# Patient Record
Sex: MALE | Race: ASIAN | NOT HISPANIC OR LATINO | URBAN - METROPOLITAN AREA
[De-identification: names, ages, dates, MRNs, and addresses within clinical notes are randomized per-mention and may not be internally consistent; named-entity substitution may affect disease eponyms.]

---

## 2019-03-04 ENCOUNTER — EMERGENCY (EMERGENCY)
Facility: HOSPITAL | Age: 34
LOS: 1 days | Discharge: HOME | End: 2019-03-04
Attending: EMERGENCY MEDICINE | Admitting: EMERGENCY MEDICINE
Payer: MEDICAID

## 2019-03-04 VITALS
SYSTOLIC BLOOD PRESSURE: 123 MMHG | TEMPERATURE: 98 F | HEART RATE: 100 BPM | RESPIRATION RATE: 18 BRPM | DIASTOLIC BLOOD PRESSURE: 88 MMHG | OXYGEN SATURATION: 100 %

## 2019-03-04 PROCEDURE — 99284 EMERGENCY DEPT VISIT MOD MDM: CPT

## 2019-03-04 RX ORDER — DIAZEPAM 5 MG
1 TABLET ORAL
Qty: 6 | Refills: 0 | OUTPATIENT
Start: 2019-03-04 | End: 2019-03-05

## 2019-03-04 RX ORDER — KETOROLAC TROMETHAMINE 30 MG/ML
1 SYRINGE (ML) INJECTION
Qty: 20 | Refills: 0 | OUTPATIENT
Start: 2019-03-04 | End: 2019-03-08

## 2019-03-04 RX ORDER — KETOROLAC TROMETHAMINE 30 MG/ML
30 SYRINGE (ML) INJECTION ONCE
Qty: 0 | Refills: 0 | Status: DISCONTINUED | OUTPATIENT
Start: 2019-03-04 | End: 2019-03-04

## 2019-03-04 RX ORDER — DIAZEPAM 5 MG
10 TABLET ORAL ONCE
Qty: 0 | Refills: 0 | Status: DISCONTINUED | OUTPATIENT
Start: 2019-03-04 | End: 2019-03-04

## 2019-03-04 RX ORDER — SODIUM CHLORIDE 9 MG/ML
1000 INJECTION INTRAMUSCULAR; INTRAVENOUS; SUBCUTANEOUS ONCE
Qty: 0 | Refills: 0 | Status: COMPLETED | OUTPATIENT
Start: 2019-03-04 | End: 2019-03-04

## 2019-03-04 RX ADMIN — Medication 10 MILLIGRAM(S): at 12:56

## 2019-03-04 RX ADMIN — SODIUM CHLORIDE 1000 MILLILITER(S): 9 INJECTION INTRAMUSCULAR; INTRAVENOUS; SUBCUTANEOUS at 12:56

## 2019-03-04 RX ADMIN — Medication 30 MILLIGRAM(S): at 12:56

## 2019-03-04 NOTE — ED PROVIDER NOTE - OBJECTIVE STATEMENT
34 y/o M with no significant PMHx presents to ED with R upper back pain since 2 weeks. Pt states that the pain suddenly began when he woke up 2 weeks ago. Pt notes that the pain was originally on the L and R side of the back, but it now is only on the R side. Pt reports using heating pads and taking muscle relaxer without improvement of symptoms. Associated with these symptoms pt has stiffness and diarrhea. Pt reports that the pain worsens at night. 34 y/o M with no significant PMHx presents to ED with R upper back pain since 2 weeks. Pt states that the pain suddenly began when he woke up 2 weeks ago. Pt notes that the pain was originally on the L and R side of the back, but it now is only on the R side. Pt reports using heating pads and taking muscle relaxer without improvement of symptoms. Associated with these symptoms pt has stiffness and diarrhea. Pt reports that the pain worsens at night and with movement.

## 2019-03-04 NOTE — ED ADULT TRIAGE NOTE - CHIEF COMPLAINT QUOTE
Pt states woke up with right upper back pain radiates to shoulder and neck x 2 weeks pain does not radiate into chest. Pt states took medication for pain no relief.

## 2019-03-04 NOTE — ED PROVIDER NOTE - NSFOLLOWUPINSTRUCTIONS_ED_ALL_ED_FT
-- Continue all previously prescribed medications as directed unless otherwise instructed.    -- Follow up with your primary care physician in 48-72 hours- bring copies of your results.    -- Return to the ER for worsening or persistent symptoms, and/or ANY NEW OR CONCERNING SYMPTOMS.    -- If you have issues obtaining follow up, please call: [phone] to obtain a [facility] doctor or specialist who takes your insurance in your area.

## 2019-03-04 NOTE — ED PROVIDER NOTE - MUSCULOSKELETAL, MLM
Spine appears normal, range of motion is not limited, muscle ttp along the R paraspinal area into the upper trapezius muscle, no joint tenderness

## 2019-08-08 ENCOUNTER — EMERGENCY (EMERGENCY)
Facility: HOSPITAL | Age: 34
LOS: 1 days | Discharge: HOME | End: 2019-08-08
Attending: EMERGENCY MEDICINE | Admitting: EMERGENCY MEDICINE
Payer: MEDICAID

## 2019-08-08 VITALS
OXYGEN SATURATION: 100 % | HEART RATE: 82 BPM | WEIGHT: 156.97 LBS | SYSTOLIC BLOOD PRESSURE: 127 MMHG | TEMPERATURE: 98 F | HEIGHT: 68 IN | RESPIRATION RATE: 16 BRPM | DIASTOLIC BLOOD PRESSURE: 78 MMHG

## 2019-08-08 VITALS
SYSTOLIC BLOOD PRESSURE: 127 MMHG | HEART RATE: 70 BPM | TEMPERATURE: 98 F | OXYGEN SATURATION: 100 % | RESPIRATION RATE: 16 BRPM | DIASTOLIC BLOOD PRESSURE: 86 MMHG

## 2019-08-08 DIAGNOSIS — F12.10 CANNABIS ABUSE, UNCOMPLICATED: ICD-10-CM

## 2019-08-08 DIAGNOSIS — F41.9 ANXIETY DISORDER, UNSPECIFIED: ICD-10-CM

## 2019-08-08 LAB
ALBUMIN SERPL ELPH-MCNC: 4.3 G/DL — SIGNIFICANT CHANGE UP (ref 3.3–5)
ALP SERPL-CCNC: 103 U/L — SIGNIFICANT CHANGE UP (ref 40–120)
ALT FLD-CCNC: 26 U/L — SIGNIFICANT CHANGE UP (ref 4–41)
AMPHET UR-MCNC: NEGATIVE — SIGNIFICANT CHANGE UP
ANION GAP SERPL CALC-SCNC: 14 MMO/L — SIGNIFICANT CHANGE UP (ref 7–14)
APAP SERPL-MCNC: < 15 UG/ML — LOW (ref 15–25)
AST SERPL-CCNC: 15 U/L — SIGNIFICANT CHANGE UP (ref 4–40)
BARBITURATES UR SCN-MCNC: NEGATIVE — SIGNIFICANT CHANGE UP
BASOPHILS # BLD AUTO: 0.07 K/UL — SIGNIFICANT CHANGE UP (ref 0–0.2)
BASOPHILS NFR BLD AUTO: 0.7 % — SIGNIFICANT CHANGE UP (ref 0–2)
BENZODIAZ UR-MCNC: NEGATIVE — SIGNIFICANT CHANGE UP
BILIRUB SERPL-MCNC: < 0.2 MG/DL — LOW (ref 0.2–1.2)
BUN SERPL-MCNC: 11 MG/DL — SIGNIFICANT CHANGE UP (ref 7–23)
CALCIUM SERPL-MCNC: 9.7 MG/DL — SIGNIFICANT CHANGE UP (ref 8.4–10.5)
CANNABINOIDS UR-MCNC: POSITIVE — SIGNIFICANT CHANGE UP
CHLORIDE SERPL-SCNC: 104 MMOL/L — SIGNIFICANT CHANGE UP (ref 98–107)
CO2 SERPL-SCNC: 23 MMOL/L — SIGNIFICANT CHANGE UP (ref 22–31)
COCAINE METAB.OTHER UR-MCNC: NEGATIVE — SIGNIFICANT CHANGE UP
CREAT SERPL-MCNC: 0.77 MG/DL — SIGNIFICANT CHANGE UP (ref 0.5–1.3)
EOSINOPHIL # BLD AUTO: 0.18 K/UL — SIGNIFICANT CHANGE UP (ref 0–0.5)
EOSINOPHIL NFR BLD AUTO: 1.7 % — SIGNIFICANT CHANGE UP (ref 0–6)
ETHANOL BLD-MCNC: < 10 MG/DL — SIGNIFICANT CHANGE UP
GLUCOSE SERPL-MCNC: 101 MG/DL — HIGH (ref 70–99)
HCT VFR BLD CALC: 47.4 % — SIGNIFICANT CHANGE UP (ref 39–50)
HGB BLD-MCNC: 16.6 G/DL — SIGNIFICANT CHANGE UP (ref 13–17)
IMM GRANULOCYTES NFR BLD AUTO: 0.3 % — SIGNIFICANT CHANGE UP (ref 0–1.5)
LYMPHOCYTES # BLD AUTO: 3.47 K/UL — HIGH (ref 1–3.3)
LYMPHOCYTES # BLD AUTO: 33 % — SIGNIFICANT CHANGE UP (ref 13–44)
MCHC RBC-ENTMCNC: 29.9 PG — SIGNIFICANT CHANGE UP (ref 27–34)
MCHC RBC-ENTMCNC: 35 % — SIGNIFICANT CHANGE UP (ref 32–36)
MCV RBC AUTO: 85.3 FL — SIGNIFICANT CHANGE UP (ref 80–100)
METHADONE UR-MCNC: NEGATIVE — SIGNIFICANT CHANGE UP
MONOCYTES # BLD AUTO: 0.98 K/UL — HIGH (ref 0–0.9)
MONOCYTES NFR BLD AUTO: 9.3 % — SIGNIFICANT CHANGE UP (ref 2–14)
NEUTROPHILS # BLD AUTO: 5.77 K/UL — SIGNIFICANT CHANGE UP (ref 1.8–7.4)
NEUTROPHILS NFR BLD AUTO: 55 % — SIGNIFICANT CHANGE UP (ref 43–77)
NRBC # FLD: 0 K/UL — SIGNIFICANT CHANGE UP (ref 0–0)
OPIATES UR-MCNC: NEGATIVE — SIGNIFICANT CHANGE UP
OXYCODONE UR-MCNC: NEGATIVE — SIGNIFICANT CHANGE UP
PCP UR-MCNC: NEGATIVE — SIGNIFICANT CHANGE UP
PLATELET # BLD AUTO: 362 K/UL — SIGNIFICANT CHANGE UP (ref 150–400)
PMV BLD: 9.1 FL — SIGNIFICANT CHANGE UP (ref 7–13)
POTASSIUM SERPL-MCNC: 3.7 MMOL/L — SIGNIFICANT CHANGE UP (ref 3.5–5.3)
POTASSIUM SERPL-SCNC: 3.7 MMOL/L — SIGNIFICANT CHANGE UP (ref 3.5–5.3)
PROT SERPL-MCNC: 7.2 G/DL — SIGNIFICANT CHANGE UP (ref 6–8.3)
RBC # BLD: 5.56 M/UL — SIGNIFICANT CHANGE UP (ref 4.2–5.8)
RBC # FLD: 13.1 % — SIGNIFICANT CHANGE UP (ref 10.3–14.5)
SALICYLATES SERPL-MCNC: < 5 MG/DL — LOW (ref 15–30)
SODIUM SERPL-SCNC: 141 MMOL/L — SIGNIFICANT CHANGE UP (ref 135–145)
TSH SERPL-MCNC: 0.51 UIU/ML — SIGNIFICANT CHANGE UP (ref 0.27–4.2)
WBC # BLD: 10.5 K/UL — SIGNIFICANT CHANGE UP (ref 3.8–10.5)
WBC # FLD AUTO: 10.5 K/UL — SIGNIFICANT CHANGE UP (ref 3.8–10.5)

## 2019-08-08 PROCEDURE — 90792 PSYCH DIAG EVAL W/MED SRVCS: CPT

## 2019-08-08 PROCEDURE — 99284 EMERGENCY DEPT VISIT MOD MDM: CPT

## 2019-08-08 RX ORDER — ESZOPICLONE 2 MG/1
1 TABLET, COATED ORAL
Qty: 3 | Refills: 0
Start: 2019-08-08 | End: 2019-08-10

## 2019-08-08 RX ORDER — SODIUM CHLORIDE 9 MG/ML
1000 INJECTION INTRAMUSCULAR; INTRAVENOUS; SUBCUTANEOUS ONCE
Refills: 0 | Status: COMPLETED | OUTPATIENT
Start: 2019-08-08 | End: 2019-08-08

## 2019-08-08 RX ORDER — LANOLIN ALCOHOL/MO/W.PET/CERES
1 CREAM (GRAM) TOPICAL
Qty: 10 | Refills: 0
Start: 2019-08-08 | End: 2019-08-17

## 2019-08-08 RX ADMIN — SODIUM CHLORIDE 1000 MILLILITER(S): 9 INJECTION INTRAMUSCULAR; INTRAVENOUS; SUBCUTANEOUS at 11:05

## 2019-08-08 NOTE — ED BEHAVIORAL HEALTH ASSESSMENT NOTE - SUICIDE PROTECTIVE FACTORS
Identifies reasons for living/Future oriented/Responsibility to family and others/Supportive social network or family/Engaged in work or school/Positive therapeutic relationships/High spirituality

## 2019-08-08 NOTE — ED BEHAVIORAL HEALTH ASSESSMENT NOTE - DIFFERENTIAL
Cannabis Use Disorder  Anxiety disorder NOS  R/O Cannabis induced mood disorder  R/O Cannabis induced anxiety disorder

## 2019-08-08 NOTE — ED BEHAVIORAL HEALTH NOTE - BEHAVIORAL HEALTH NOTE
SW called father Carlos A [phone] for collateral. Father stated he is aware of hospital visit and asked to speak with patients sister Tatiana [phone]. SW spoke with Tatiana for collateral. Sister stated that patient for the last month unable to sleep. Patient has gone to multiple hospitals in the [place], given IV medication but nothing is long lasting. Patient lives in the [place] with a friend and works at a [employer].  Sister denies any psych hx or violence at home. Patient smoke marijuana regularly. Patient has no medical, or legal concerns. No hx of trauma. SW asked about ‘plans for killing specific person’ to sister. Sister stated patient has never expressed SI nor killing anyone. She lives about 3 hours away and saw him today to bring to ER for sleep. Sister has no safety concerns regarding patient actions. Team made aware of above.     As per team patient medically and psychiatrically cleared for discharge. SW met with patient at bedside. Patient stated sister left a separate car in lot and needs to go back to work. SW called sister to see where car is located. Sister stated she left with the car and it would take her three hours to .  Sister advised for patient to go to [place] to [facility] and she can meet him there. SW met with patient and discussed taxi option through insurance. Patient in agreement with taxi to [place] address. SW also educated on resources such as BH and substance abuse. Patient declined and does not want support services.  SW called [facility] spoke with Jonelle set up taxi with [employer] [phone]. Confirmation#[id number]. SW called taxi and ETA is 10min when patient fully ready for discharge SW will continue to monitor as needed. Team made aware.
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
## 2019-08-08 NOTE — ED PROVIDER NOTE - PROGRESS NOTE DETAILS
HERB Morris: Patient endorsed to HERB Morris and MD Pepper. Pt endorses HI, states I just have want to hurt somebody." Pt states "I get into an argument and then can't control myself." Pt endorses 15 days of insomnia and diarrhea which began after he discontinued opioid use. Pt states he would take 5-10 10mg oxycodone pills per day, also is a cigarette and marijuana smoker but stopped use after his mother expressed concern. On exam pt is well appearing no abdominal tenderness 1 to 1 initiated due to HI pt denies SI states " I would never hurt myself." Pt is cooperative with ED staff at this time. HERB Morris: Abdomen is soft, pt reassessed did not have diarrhea while in ED states he is hungry labs grossly normal. Pt states last opioid use was 2 weeks ago withdraw would not fit clinical picture. Called  who is aware. Spoke to [facility]. HERB Morris: Abdomen is soft, pt reassessed did not have diarrhea while in ED states he is hungry labs grossly normal. Pt states last opioid use was 2 weeks ago withdraw would not fit clinical picture. Called  who is aware. HERB Morris: Pt seen by psychiatry team in behavioral health who state pt is safe for D/C and would not meet admission criteria to [facility]. Recommends D/C with a short course of a sleep aid.

## 2019-08-08 NOTE — ED PROVIDER NOTE - NS ED ROS FT
Gen: Denies fever, weight loss  CV: Denies chest pain, palpitations  Skin: Denies rash, erythema, color changes  Resp: Denies SOB, cough  Endo: Increased sensitivity to cold.   GI: Endorses nausea, diarrhea, vomiting  Msk: Denies back pain, LE swelling, extremity pain  : Denies dysuria, increased frequency  Neuro: Denies LOC, weakness, seizures  Psych: Denies hx of psych, hallucinations, endorses HI w/ plan
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
## 2019-08-08 NOTE — ED ADULT NURSE REASSESSMENT NOTE - NS ED NURSE REASSESS COMMENT FT1
Pt received from main ED, pt restless insisting on speaking to a physician, pt redirectable at the time, denies any medical complaints will continue to monitor.

## 2019-08-08 NOTE — ED BEHAVIORAL HEALTH ASSESSMENT NOTE - RISK ASSESSMENT
Risk Assessment:  Modifiable risk factors: anxiety, THC abuse   Unmodifiable risk factors: dental procedure, presentation of irritability   Protective factors: no past psych hx, no hx of SA, no psych hosp, Pt has sense of responsibility to sons, positive therapeutic relationship, social supports, Quaker, no current suicidality or homicidality.     Given above, patient remains appropriate for current level of care. Modifiable risk factors will be addressed once he is engaged in aftercare follow up in the community.  At this time, the Pt is at low risk of self-harm.  There is no identifiable acute increase in risk that would be mitigated by an involuntary psychiatric admission. Pt may be safely discharged back to the community

## 2019-08-08 NOTE — ED BEHAVIORAL HEALTH NOTE - BEHAVIORAL HEALTH NOTE
C-SSRS Screener     1. Have you ever wished to be dead or wished you could go to sleep and not wake up?  [  ]Yes, [ x ]No, [  ]Unable to Assess  Details _____________________________     2. Have you actually had any thoughts of killing yourself?   [  ]Yes, [ x ]No, [  ]Unable to Assess  Details _____________________________     If answer is “No” for 1 and 2, stop here. If answer is “Yes” to 1 or 2, proceed to 3.     3. Have you been thinking about how you might kill yourself?  [  ]Yes, [x  ]No, [  ]Unable to Assess  Details _____________________________     4. Have you had these thoughts and had some intention of acting on them?  [  ]Yes, [ x ]No, [  ]Unable to Assess  Details _____________________________     5. Have you started to work out or worked out the details of how to kill yourself? Do you intend to carry out this plan?  [  ]Yes, [x  ]No, [  ]Unable to Assess  Details _____________________________     6. Have you ever done anything, started to do anything, or prepared to do anything to end your life? If so, was it in the past 3 months?  [  ]Yes, [ x ]No, [  ]Unable to Assess  Details _____________________________        Additional Suicide Risk Factors (select all that apply)  [  ]Access to lethal means including firearms  [  ]Family history of suicide  [x  ]Impulsivity  [ x ] Current mood disorder: irritable   [  ] Current or past psychotic disorder  [  ] Current or past PTSD  [  ] Current or past ADHD  [  ] Current or past TBI  [  ] Current or past cluster B personality disorder or traits  [  ] Current or past conduct problems  [  ] Recent onset of current or past psychiatric disorder  [  ] Family history of psychiatric diagnoses requiring hospitalization     Additional Activating Events (select all that apply)  [  ]Perceived burden on family or others  [  ]Current sexual or physical abuse  [ x ]Substance intoxication or withdrawal  [  ]Inadequate social supports  [  ]Hopeless about or dissatisfied with current provider or treatment     Additional Protective Factors (select all that apply)  [x  ] Future plans  [ x] Restoration beliefs  [  ] Beloved pets    Safety Plan Details:  [ x ] Safety plan discussed with patient    [x  ] Education provided regarding environmental safety / lethal means restriction    [ x ] Provision of National Suicide Prevention Lifeline 4-454-750-XITT (2794) C-SSRS Screener     1. Have you ever wished to be dead or wished you could go to sleep and not wake up?  [  ]Yes, [ x ]No, [  ]Unable to Assess  Details _____________________________     2. Have you actually had any thoughts of killing yourself?   [  ]Yes, [ x ]No, [  ]Unable to Assess  Details _____________________________     If answer is “No” for 1 and 2, stop here. If answer is “Yes” to 1 or 2, proceed to 3.     3. Have you been thinking about how you might kill yourself?  [  ]Yes, [x  ]No, [  ]Unable to Assess  Details _____________________________     4. Have you had these thoughts and had some intention of acting on them?  [  ]Yes, [ x ]No, [  ]Unable to Assess  Details _____________________________     5. Have you started to work out or worked out the details of how to kill yourself? Do you intend to carry out this plan?  [  ]Yes, [x  ]No, [  ]Unable to Assess  Details _____________________________     6. Have you ever done anything, started to do anything, or prepared to do anything to end your life? If so, was it in the past 3 months?  [  ]Yes, [ x ]No, [  ]Unable to Assess  Details _____________________________        Additional Suicide Risk Factors (select all that apply)  [  ]Access to lethal means including firearms  [  ]Family history of suicide  [x  ]Impulsivity  [ x ] Current mood disorder: irritable   [  ] Current or past psychotic disorder  [  ] Current or past PTSD  [  ] Current or past ADHD  [  ] Current or past TBI  [  ] Current or past cluster B personality disorder or traits  [  ] Current or past conduct problems  [  ] Recent onset of current or past psychiatric disorder  [  ] Family history of psychiatric diagnoses requiring hospitalization     Additional Activating Events (select all that apply)  [  ]Perceived burden on family or others  [  ]Current sexual or physical abuse  [ x ]Substance intoxication or withdrawal  [  ]Inadequate social supports  [  ]Hopeless about or dissatisfied with current provider or treatment     Additional Protective Factors (select all that apply)  [x  ] Future plans  [ x] Latter-day beliefs  [  ] Beloved pets    Safety Plan Details:    [ x ] SAFE act    [ x ] Safety plan discussed with patient    [x  ] Education provided regarding environmental safety / lethal means restriction    [ x ] Provision of National Suicide Prevention Lifeline 9-225-880-UQMU (7714)

## 2019-08-08 NOTE — ED PROVIDER NOTE - ATTENDING CONTRIBUTION TO CARE
Dr. Pepper: 34 yo male with PMH opioid use (daily taking approx 5 tabs of 10 mg oxycodone for tooth pain), in ED with diarrhea and HI after stopping oxycodone 15 days ago because he no longer wants to use.  Pt states that he has been having nonbloody diarrhea daily since stopping, denies any opiate use since stopping.  He felt unwell today and so came to the ED.  In ED pt admitted that he has thoughts about hurting others, feels like "it could happen any time", initially told resident he had a list of people he wanted to hurt but told me that he does not have a list and "it could be anyone".  Otherwise he denies CP/SOB, N/V or abdominal pain.  On exam pt unwell appearing but nontoxic, in NAD at time of my eval, heart RRR, lungs CTAB, abd NTND, extremities without swelling, strength 5/5 in all extremities and skin without rash.  No extremity tremor.  Pt oddly related.

## 2019-08-08 NOTE — ED BEHAVIORAL HEALTH ASSESSMENT NOTE - OTHER
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

## 2019-08-08 NOTE — ED PROVIDER NOTE - NSFOLLOWUPINSTRUCTIONS_ED_ALL_ED_FT
Follow up with a psychiatrist and a gastroenterologist. You can call  to schedule an appointment.  Advance activity as tolerated.  Continue all previously prescribed medications as directed.  Follow up with your primary care physician in 48-72 hours- bring copies of your results.  Return to the ER for worsening or persistent symptoms, and/or ANY NEW OR CONCERNING SYMPTOMS. This includes if you feel like hurting yourself or anybody else. If you have issues obtaining follow up, please call: [phone] to obtain a doctor or specialist who takes your insurance in your area.  You may call [phone] to make an appointment with the internal medicine clinic. Take lunesta and melatonin before bed, do not drive after taking this medication or combine the medication with alcohol or drugs. The medication has been sent to your preferred pharmacy. Follow up with a psychiatrist and a gastroenterologist. You can call  to schedule an appointment.  Advance activity as tolerated.  Continue all previously prescribed medications as directed.  Follow up with your primary care physician in 48-72 hours- bring copies of your results.  Return to the ER for worsening or persistent symptoms, and/or ANY NEW OR CONCERNING SYMPTOMS. This includes if you feel like hurting yourself or anybody else. If you have issues obtaining follow up, please call: [phone] to obtain a doctor or specialist who takes your insurance in your area.  You may call [phone] to make an appointment with the internal medicine clinic.

## 2019-08-08 NOTE — ED BEHAVIORAL HEALTH ASSESSMENT NOTE - SUMMARY
33/M with no established past psych hx, no prior psych hosp, no hx of SA/self-injurious behavior and claims he has been intermittently smoking THC (for anxiety) but denies any chronic substance abuse hx.  He reports hx of dental procedures (with removal of front teeth and placing dentures on since > 3 weeks).  Today, was seen as psych consult for evaluation of homicidality.      At this time, the Pt is not suicidal or homicidal.  He is also not showing any signs/symptoms of opiate withdrawal or any illicit drug intoxication.  Pt has + THC on toxicology.  Otherwise, since his  ED arrival, the Pt has been calm and cooperative.  There has been no agitation/aggressive behavior.  No verbalization of SI/HI.  He has not tested limits.. Has maintained appropriate boundaries.  No signs/symptoms of MDD, kale or psychosis.  He has been easily redirected.  no AH/VH.  THC may contribute to Pt's irritability as well as anxiety.  Otherwise, nothing to suggest for a primary anxiety disorder symptom at this time nor any other mood disorder.  Pt shows remorse on statements made previously.  He says that he will never resort to this level of violence as he has 2 children whom he cares and loves so much.  Collateral obtained from his sister who reported that Pt has been well, no past psych hx, no hx of violence and no hx of SA.  She did not raise any safety concerns.    RECOMMENDATIONS:   1. Psychoeducation provided.  may prescribed Lunesta 1mg HS and Melatonin 3mg HS for sleep disturbance.  Encouraged to follow up with his PCP for work up re: the sleep disturbance.    2. also encouraged to see psychiatrist of choice for evaluation and management of his anxiety  3. Pt was provided with community resources including walk into [facility] crisis centre, [facility] clinic.  He declined  4. Emergency protocol reviewed.  Pt was adviced to call 911 or come to the nearest ED should symptoms worsen; have increasing bouts of agitation/aggressive behavior; having SI/HI.    5. SAFE act reporting Reference: [id number]
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
## 2019-08-08 NOTE — ED ADULT NURSE NOTE - OBJECTIVE STATEMENT
Pt presents to rm 11, A&Ox4, ambulatory w/o assistance, denies pmhx, here for evaluation of diarrhea [id number], pt also stating he has homicidal ideation. Pt states "I dont have patience right now and if someone were to make me mad I would hurt them, maybe even kill them." Pt immediately placed on 1:1, PCA at bedside at all times, surroundings checked, belongings were removed and placed across rm 21, cellphone, airpod and apple watch taken to security w/ copy in chart, as per pt request- wallet was given to mother. Denies abd pain, chest pain, shortness of breath, palpitations, diaphoresis, headaches, fevers, dizziness, nausea, vomiting, or urinary symptoms at this time. Pt is calm and cooperative at this time. Pt stable and in NAD at this time. Pt states he was using percocet (min. 5pills a day), stopped 15days ago and began having diarrhea. IV established in right AC with a 20G, labs drawn and sent, call bell in reach, warm blanket provided, bed in lowest position, side rails up x2, MD evaluation in progress. Will continue to monitor.

## 2019-08-09 NOTE — ED BEHAVIORAL HEALTH NOTE - BEHAVIORAL HEALTH NOTE
High Risk Follow Up:  worker spoke to patient  ([phone]) who denies SI/HI. He states that he is having trouble sleeping and he plans to follow up with [facility] crisis center about medication

## 2021-07-12 ENCOUNTER — EMERGENCY (EMERGENCY)
Facility: HOSPITAL | Age: 36
LOS: 1 days | Discharge: LEFT BEFORE DISCHARGE | End: 2021-07-12
Admitting: EMERGENCY MEDICINE
Payer: MEDICAID

## 2021-07-12 VITALS
DIASTOLIC BLOOD PRESSURE: 75 MMHG | RESPIRATION RATE: 16 BRPM | SYSTOLIC BLOOD PRESSURE: 127 MMHG | HEIGHT: 68 IN | TEMPERATURE: 99 F | HEART RATE: 86 BPM | OXYGEN SATURATION: 100 %

## 2021-07-12 PROCEDURE — L9991: CPT

## 2021-07-12 NOTE — ED ADULT TRIAGE NOTE - CHIEF COMPLAINT QUOTE
Pt c/o lower back pain, states the pain was there for years and nobody can figure out what's wrong, now getting worse.

## 2022-03-30 NOTE — ED BEHAVIORAL HEALTH ASSESSMENT NOTE - DESCRIPTION
Since his  ED arrival, the Pt has been calm and cooperative.  There has been no agitation/aggressive behavior.  No verbalization of SI/HI.  He has not tested limits.. Has maintained appropriate boundaries.  No signs/symptoms of kale or psychosis.  He has been easily redirected.  no AH/VH.  No signs/symptoms suggestive of opiate withdrawal.       Vital Signs Last 24 Hrs  T(C): 36.5 (08 Aug 2019 12:50), Max: 36.8 (08 Aug 2019 09:31)  T(F): 97.7 (08 Aug 2019 12:50), Max: 98.3 (08 Aug 2019 09:31)  HR: 70 (08 Aug 2019 12:50) (70 - 82)  BP: 127/86 (08 Aug 2019 12:50) (127/78 - 127/86)  BP(mean): --  RR: 16 (08 Aug 2019 12:50) (16 - 16)  SpO2: 100% (08 Aug 2019 12:50) (100% - 100%) s/p dental procedure; he denies HTN, DM, HLD, Sz disorder, renal, hepatic impairment from [facility], , 2 sons whom he maintains good relationship with, claims owning 2 bodegas 14-Apr-2022

## 2022-11-05 NOTE — ED ADULT NURSE NOTE - ISOLATION TYPE:
None
35 yo hx of depression, etoh use, presenting with palpitations, denies si/hi, ah/vh, abd pain, sob, leg swelling. recent covid dx. pt does not seem to be in evidence of withdrawal, pending labs, ekg, cxr, reassess.
